# Patient Record
Sex: MALE | Race: BLACK OR AFRICAN AMERICAN | Employment: OTHER | URBAN - METROPOLITAN AREA
[De-identification: names, ages, dates, MRNs, and addresses within clinical notes are randomized per-mention and may not be internally consistent; named-entity substitution may affect disease eponyms.]

---

## 2019-11-12 ENCOUNTER — OP HISTORICAL/CONVERTED ENCOUNTER (OUTPATIENT)
Dept: OTHER | Age: 59
End: 2019-11-12

## 2022-06-07 ENCOUNTER — APPOINTMENT (OUTPATIENT)
Dept: CT IMAGING | Age: 62
End: 2022-06-07
Attending: PHYSICIAN ASSISTANT
Payer: MEDICAID

## 2022-06-07 ENCOUNTER — HOSPITAL ENCOUNTER (EMERGENCY)
Age: 62
Discharge: HOME | End: 2022-06-07
Attending: EMERGENCY MEDICINE
Payer: MEDICAID

## 2022-06-07 VITALS
HEIGHT: 74 IN | HEART RATE: 94 BPM | WEIGHT: 175 LBS | BODY MASS INDEX: 22.46 KG/M2 | SYSTOLIC BLOOD PRESSURE: 153 MMHG | OXYGEN SATURATION: 97 % | DIASTOLIC BLOOD PRESSURE: 100 MMHG | TEMPERATURE: 98.1 F | RESPIRATION RATE: 16 BRPM

## 2022-06-07 DIAGNOSIS — M54.2 CERVICALGIA: ICD-10-CM

## 2022-06-07 DIAGNOSIS — M50.30 DDD (DEGENERATIVE DISC DISEASE), CERVICAL: ICD-10-CM

## 2022-06-07 DIAGNOSIS — S09.90XA CLOSED HEAD INJURY, INITIAL ENCOUNTER: ICD-10-CM
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
## 2022-06-07 PROCEDURE — 74011250637 HC RX REV CODE- 250/637: Performed by: PHYSICIAN ASSISTANT

## 2022-06-07 PROCEDURE — 99284 EMERGENCY DEPT VISIT MOD MDM: CPT

## 2022-06-07 PROCEDURE — 72125 CT NECK SPINE W/O DYE: CPT

## 2022-06-07 PROCEDURE — 70450 CT HEAD/BRAIN W/O DYE: CPT

## 2022-06-07 RX ORDER — METHYLPREDNISOLONE 4 MG/1
TABLET ORAL
Qty: 1 DOSE PACK | Refills: 0 | Status: SHIPPED | OUTPATIENT
Start: 2022-06-07

## 2022-06-07 RX ORDER — TRAMADOL HYDROCHLORIDE 50 MG/1
50 TABLET ORAL
Qty: 12 TABLET | Refills: 0 | Status: SHIPPED | OUTPATIENT
Start: 2022-06-07 | End: 2022-06-10

## 2022-06-07 RX ORDER — KETOROLAC TROMETHAMINE 10 MG/1
10 TABLET, FILM COATED ORAL
Qty: 12 TABLET | Refills: 0 | Status: SHIPPED | OUTPATIENT
Start: 2022-06-07 | End: 2022-06-10

## 2022-06-07 RX ORDER — TRAMADOL HYDROCHLORIDE 50 MG/1
50 TABLET ORAL
Status: COMPLETED | OUTPATIENT
Start: 2022-06-07 | End: 2022-06-07

## 2022-06-07 RX ADMIN — TRAMADOL HYDROCHLORIDE 50 MG: 50 TABLET, COATED ORAL at 16:02

## 2022-06-07 NOTE — Clinical Note
[address] [facility] EMERGENCY DEPT  [address] [address]  [phone]    Work/School Note    Date: 6/7/2022    To Whom It May concern:    Christiano Flood was seen and treated today in the emergency room by the following provider(s):  Attending Provider: Anne Fang MD  Physician Assistant: Joelle Ritchie PA-C. Christiano Flood is excused from work/school on 06/07/22 and 06/08/22. He is medically clear to return to work/school on 6/9/2022.        Sincerely,          Romana Cole Pidcoe, PA-C
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
## 2022-08-30 ENCOUNTER — HOSPITAL ENCOUNTER (OUTPATIENT)
Dept: PHYSICAL THERAPY | Age: 62
Discharge: HOME | End: 2022-08-30
Payer: MEDICAID

## 2022-08-30 PROCEDURE — 97162 PT EVAL MOD COMPLEX 30 MIN: CPT
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
## 2022-09-14 ENCOUNTER — HOSPITAL ENCOUNTER (OUTPATIENT)
Dept: PHYSICAL THERAPY | Age: 62
Discharge: HOME | End: 2022-09-14
Payer: MEDICAID

## 2022-09-14 PROCEDURE — 97014 ELECTRIC STIMULATION THERAPY: CPT

## 2022-09-14 PROCEDURE — 97110 THERAPEUTIC EXERCISES: CPT
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
## 2022-09-19 ENCOUNTER — HOSPITAL ENCOUNTER (OUTPATIENT)
Dept: PHYSICAL THERAPY | Age: 62
Discharge: HOME | End: 2022-09-19
Payer: MEDICAID

## 2022-09-19 PROCEDURE — 97014 ELECTRIC STIMULATION THERAPY: CPT

## 2022-09-19 PROCEDURE — 97110 THERAPEUTIC EXERCISES: CPT

## 2022-09-19 NOTE — PROGRESS NOTES
PT DAILY TREATMENT NOTE     Patient Name: Андрей Mora  Date:2022  : 1960  [x]  Patient  Verified  Payor: [facility] MEDICAID / Plan: [facility] [employer] / Product Type: Managed Care Medicaid /    Treatment Area: Neck pain [M54.2]   Next MD APPT:   In time: 3:05 pm    Out time:4:00pm pm  Total Treatment Time (min): 55  Visit #: 3/12-16    SUBJECTIVE    Any medication changes, allergies to medications, adverse drug reactions, diagnosis change, or new procedure performed?:   [x] No    [] Yes (see summary sheet for update)    Pain Level (0-10 scale) pre treatment: 2/10 soreness  Pain Level (0-10 scale) post treatment: 2/10    Subjective functional status/changes:   [x] No changes reported  Pt reports no new complaints from previous session; states he feels some soreness from previous session but not pain.      OBJECTIVE    Modality rationale: decrease inflammation and decrease pain to improve the patients ability to perform ADL's and drive without pain    Min Type Additional Details   15 [x] Estim: [x]UnAtt   []Att       []TENS instruct                  [x]IFC  []Premod   []NMES                     []Other:  []w/US   []w/ice   [x]w/heat  Position:supine  Location:cervical    []  Ice     []  Heat  []  Ice massage Position:  Location:    []  Traction: [] Cervical       []Lumbar                       [] Prone          []Supine                       []Intermittent   []Continuous Lbs:  []w/heat  []W/heat and Estim    []  Ultrasound: []Continuous   [] Pulsed at:                           []1MHz   []3MHz Location:  W/cm2:    *keep ES intensity low  [x] Skin assessment post-treatment:   [x]intact  []redness- no adverse reaction   []redness - adverse reaction:     40 min Therapeutic Exercise:  [x] See flow sheet :   Rationale: increase ROM and increase strength to improve the patients ability to perform ADL's and drive without pain      With   [x] TE   [] TA   [] Neuro   [] SC   [] other: Patient Education: [x] Review HEP    [] Progressed/Changed HEP based on:   [] positioning   [] body mechanics   [] transfers   [] Use of heat/ice    [] other:          Other Objective/Functional Measures:     ASSESSMENT/Changes in Function:   Pt tolerated session well with no exacerbation of symptoms. Pt very guarded in upper traps and c/s during therex, requiring verbal cues for scapular depression and proper breathing throughout exercises. All exercises tolerated well without increase in symptoms. Plan to continue to progress per POC as tolerated. Patient will continue to benefit from skilled PT services to address functional mobility deficits, address ROM deficits, address strength deficits, analyze and address soft tissue restrictions, analyze and modify body mechanics/ergonomics, assess and modify postural abnormalities, and instruct in home and community integration to attain remaining goals. GOALS/Progress towards goals:  Patient Goal (s):  pain relief     [] Met [] Not met [] Partially met   Short Term Goals: To be accomplished in 4-6  treatments. 1) Pt will be Independent with HEP. [x] Met [] Not met [] Partially met   2) Pt will use heat/ice/muscle rubs/massage as instructed to ease pain. [] Met [] Not met [] Partially met   3) Pt will demonstrate improved postural awareness for sitting and standing to decrease pain. [] Met [] Not met [] Partially met  4) Pt will use proper sleeping techniques for pain prevention. [] Met [] Not met [] Partially met      Long Term Goals: To be accomplished in 12-16  treatments. 1) Pt will have improved AMPAC score by 5%. [] Met [] Not met [] Partially met   2) Pt will have cervical/shoulder ROM/MMT WFL to improve joint function and core stability without pain. [] Met [] Not met [] Partially met   3) Pt will be able to turn his head while crossing the street without increase of neck pain.       [] Met [] Not met [] Partially met   4) Pt will be able to look over shoulders while driving without increase of neck pain. [] Met [] Not met [] Partially met   5) patient will be able to hold his head up >60 min which watching TV and report no neck pain or discomfort. [] Met [] Not met [] Partially met      6) Pt will report fewer and less intense headaches. [] Met [] Not met [] Partially met      Treatment Plan may include any combination of the following: Therapeutic exercise, Therapeutic activities, Neuromuscular re-education, Physical agent/modality, Gait/balance training, Manual therapy, Patient education, and Functional mobility training    Frequency / Duration: Patient to be seen 2 times per week for 12-16  treatments.     Certification Period: 8/30/22-11/30/22    PLAN  [x]  Continue plan of care  [x]  Upgrade activities as tolerated       [x]  Update interventions per flow sheet       []  Discharge due to:  []  Other:     Kwabena Saliva, PTA 9/19/2022

## 2022-09-21 ENCOUNTER — HOSPITAL ENCOUNTER (OUTPATIENT)
Dept: PHYSICAL THERAPY | Age: 62
Discharge: HOME | End: 2022-09-21
Payer: MEDICAID

## 2022-09-21 PROCEDURE — 97014 ELECTRIC STIMULATION THERAPY: CPT

## 2022-09-21 PROCEDURE — 97110 THERAPEUTIC EXERCISES: CPT

## 2022-09-21 PROCEDURE — 97140 MANUAL THERAPY 1/> REGIONS: CPT

## 2022-09-21 NOTE — PROGRESS NOTES
PT DAILY TREATMENT NOTE     Patient Name: Chichi Willson  Date:2022  : 1960  [x]  Patient  Verified  Payor: [facility] MEDICAID / Plan: [employer] / Product Type: Managed Care Medicaid /    Treatment Area: Neck pain [M54.2]   Next MD APPT:   In time: 12:58 pm    Out time: 1:45pm  Total Treatment Time (min): 52  Visit #: -    SUBJECTIVE    Any medication changes, allergies to medications, adverse drug reactions, diagnosis change, or new procedure performed?:   [x] No    [] Yes (see summary sheet for update)    Pain Level (0-10 scale) pre treatment: 2/10 soreness  Pain Level (0-10 scale) post treatment: 1/10    Subjective functional status/changes:   [x] No changes reported  Pt reports no new complaints; continued soreness and stiffness in the neck.      OBJECTIVE    Modality rationale: decrease inflammation and decrease pain to improve the patients ability to perform ADL's and drive without pain    Min Type Additional Details   10 [x] Estim: [x]UnAtt   []Att       []TENS instruct                  [x]IFC  []Premod   []NMES                     []Other:  []w/US   []w/ice   [x]w/heat  Position:supine  Location:cervical    []  Ice     []  Heat  []  Ice massage Position:  Location:    []  Traction: [] Cervical       []Lumbar                       [] Prone          []Supine                       []Intermittent   []Continuous Lbs:  []w/heat  []W/heat and Estim    []  Ultrasound: []Continuous   [] Pulsed at:                           []1MHz   []3MHz Location:  W/cm2:    *keep ES intensity low  [x] Skin assessment post-treatment:   [x]intact  []redness- no adverse reaction   []redness - adverse reaction:     27 min Therapeutic Exercise:  [x] See flow sheet :   Rationale: increase ROM and increase strength to improve the patients ability to perform ADL's and drive without pain  10 min Manual Therapy: stm c/s paraspinals, gentle traction, gentle first rib mob on (R)    Rationale: decrease pain, increase ROM, increase tissue extensibility, and decrease trigger points to improve the patients ability to perform functional tasks with decreased pain     With   [x] TE   [] TA   [] Neuro   [] SC   [] other: Patient Education: [x] Review HEP    [] Progressed/Changed HEP based on:   [] positioning   [] body mechanics   [] transfers   [] Use of heat/ice    [] other:          Other Objective/Functional Measures:     *Note pt has significant (R)first rib elevation       ASSESSMENT/Changes in Function:   Pt tolerated session well with no exacerbation of symptoms. Pt continues to present very guarded throughout therex in c/s musculature, requiring cues for breathing and relaxation. Note pt with significant (R) first rib elevation w/ TTP. Manual therapy yielded relief. Plan to continue to progress per POC as tolerated. Patient will continue to benefit from skilled PT services to address functional mobility deficits, address ROM deficits, address strength deficits, analyze and address soft tissue restrictions, analyze and modify body mechanics/ergonomics, assess and modify postural abnormalities, and instruct in home and community integration to attain remaining goals. GOALS/Progress towards goals:  Patient Goal (s):  pain relief     [] Met [] Not met [] Partially met   Short Term Goals: To be accomplished in 4-6  treatments. 1) Pt will be Independent with HEP. [x] Met [] Not met [] Partially met   2) Pt will use heat/ice/muscle rubs/massage as instructed to ease pain. [] Met [] Not met [] Partially met   3) Pt will demonstrate improved postural awareness for sitting and standing to decrease pain. [] Met [] Not met [] Partially met  4) Pt will use proper sleeping techniques for pain prevention. [] Met [] Not met [] Partially met      Long Term Goals: To be accomplished in 12-16  treatments. 1) Pt will have improved AMPAC score by 5%.         [] Met [] Not met [] Partially met   2) Pt will have cervical/shoulder ROM/MMT WFL to improve joint function and core stability without pain. [] Met [] Not met [] Partially met   3) Pt will be able to turn his head while crossing the street without increase of neck pain. [] Met [] Not met [] Partially met   4) Pt will be able to look over shoulders while driving without increase of neck pain. [] Met [] Not met [] Partially met   5) patient will be able to hold his head up >60 min which watching TV and report no neck pain or discomfort. [] Met [] Not met [] Partially met      6) Pt will report fewer and less intense headaches. [] Met [] Not met [] Partially met      Treatment Plan may include any combination of the following: Therapeutic exercise, Therapeutic activities, Neuromuscular re-education, Physical agent/modality, Gait/balance training, Manual therapy, Patient education, and Functional mobility training    Frequency / Duration: Patient to be seen 2 times per week for 12-16  treatments.     Certification Period: 8/30/22-11/30/22    PLAN  [x]  Continue plan of care  [x]  Upgrade activities as tolerated       [x]  Update interventions per flow sheet       []  Discharge due to:  [x]  Other: add R first rib mob w/ strap    Junior Alcantar, PTA 9/21/2022

## 2022-09-26 ENCOUNTER — HOSPITAL ENCOUNTER (OUTPATIENT)
Dept: PHYSICAL THERAPY | Age: 62
Discharge: HOME | End: 2022-09-26
Payer: MEDICAID

## 2022-09-26 PROCEDURE — 97110 THERAPEUTIC EXERCISES: CPT

## 2022-09-26 PROCEDURE — 97140 MANUAL THERAPY 1/> REGIONS: CPT

## 2022-09-26 PROCEDURE — 97014 ELECTRIC STIMULATION THERAPY: CPT

## 2022-09-26 NOTE — PROGRESS NOTES
PT DAILY TREATMENT NOTE     Patient Name: Yeimy Mensah  Date:2022  : 1960  [x]  Patient  Verified  Payor: [facility] MEDICAID / Plan: [place] [place] / Product Type: Managed Care Medicaid /    Treatment Area: Neck pain [M54.2]   Next MD APPT:   In time: 01:00 pm    Out time: 02:00 pm  Total Treatment Time (min): 60 min  Visit #: -    SUBJECTIVE    Any medication changes, allergies to medications, adverse drug reactions, diagnosis change, or new procedure performed?:   [x] No    [] Yes (see summary sheet for update)    Pain Level (0-10 scale) pre treatment: 2/10 R/L cervical  Pain Level (0-10 scale) post treatment: 0/10    Subjective functional status/changes:   [x] No changes reported  Pt reports he woke up last night with pain but was able to go back to sleep. He states he likes to sleep on his R side. \"That feels a whole lot loose now . \"  OBJECTIVE    Modality rationale: decrease inflammation and decrease pain to improve the patients ability to perform ADL's and drive without pain    Min Type Additional Details   15 [x] Estim: [x]UnAtt   []Att       []TENS instruct                  [x]IFC  []Premod   []NMES                     []Other:  []w/US   []w/ice   [x]w/heat  Position:supine  Location:cervical    []  Ice     []  Heat  []  Ice massage Position:  Location:    []  Traction: [] Cervical       []Lumbar                       [] Prone          []Supine                       []Intermittent   []Continuous Lbs:  []w/heat  []W/heat and Estim    []  Ultrasound: []Continuous   [] Pulsed at:                           []1MHz   []3MHz Location:  W/cm2:    *keep ES intensity low  [x] Skin assessment post-treatment:   [x]intact  []redness- no adverse reaction   []redness - adverse reaction:     30 min Therapeutic Exercise:  [x] See flow sheet :   Rationale: increase ROM and increase strength to improve the patients ability to perform ADL's and drive without pain  15 min Manual Therapy: Rock tool to cervical paraspinals and scalenes   Rationale: decrease pain, increase ROM, increase tissue extensibility, and decrease trigger points to improve the patients ability to perform functional tasks with decreased pain     With   [x] TE   [] TA   [] Neuro   [] SC   [] other: Patient Education: [x] Review HEP    [] Progressed/Changed HEP based on:   [] positioning   [] body mechanics   [] transfers   [] Use of heat/ice    [x] other: Rock tool         Other Objective/Functional Measures:added scapular exercises in prone and supine  Patient with several trigger points to R UT/scalenes and L>R paraspinals. Decrease in pain/soreness noted following MT          ASSESSMENT/Changes in Function: Patient tolerance to treatment:  [] poor  [] fair  [x] good  []  excellent Patient with several trigger points noted. He was very TTP on L/R. He tolerated scapular exercises well and reported decrease in soreness. Patient also has skin discoloration along UT/cervical spine area due to medication he is taking. No pain post treatment session today. Patient will continue to benefit from skilled PT services to address functional mobility deficits, address ROM deficits, address strength deficits, analyze and address soft tissue restrictions, analyze and modify body mechanics/ergonomics, assess and modify postural abnormalities, and instruct in home and community integration to attain remaining goals. GOALS/Progress towards goals:  Patient Goal (s):  pain relief     [] Met [] Not met [] Partially met   Short Term Goals: To be accomplished in 4-6  treatments. 1) Pt will be Independent with HEP. [x] Met [] Not met [] Partially met   2) Pt will use heat/ice/muscle rubs/massage as instructed to ease pain. [] Met [] Not met [] Partially met   3) Pt will demonstrate improved postural awareness for sitting and standing to decrease pain.       [] Met [] Not met [] Partially met  4) Pt will use proper sleeping techniques for pain prevention. [] Met [] Not met [] Partially met      Long Term Goals: To be accomplished in 12-16  treatments. 1) Pt will have improved AMPAC score by 5%. [] Met [] Not met [] Partially met   2) Pt will have cervical/shoulder ROM/MMT WFL to improve joint function and core stability without pain. [] Met [] Not met [] Partially met   3) Pt will be able to turn his head while crossing the street without increase of neck pain. [] Met [] Not met [] Partially met   4) Pt will be able to look over shoulders while driving without increase of neck pain. [] Met [] Not met [] Partially met   5) patient will be able to hold his head up >60 min which watching TV and report no neck pain or discomfort. [] Met [] Not met [] Partially met      6) Pt will report fewer and less intense headaches. [] Met [] Not met [] Partially met      Treatment Plan may include any combination of the following: Therapeutic exercise, Therapeutic activities, Neuromuscular re-education, Physical agent/modality, Gait/balance training, Manual therapy, Patient education, and Functional mobility training    Frequency / Duration: Patient to be seen 2 times per week for 12-16  treatments.     Certification Period: 8/30/22-11/30/22    PLAN  [x]  Continue plan of care  [x]  Upgrade activities as tolerated       [x]  Update interventions per flow sheet       []  Discharge due to:  [x]  Other: add R first rib mob w/ strap    Didi Monaco, PTA 9/26/2022

## 2022-09-28 ENCOUNTER — HOSPITAL ENCOUNTER (OUTPATIENT)
Dept: PHYSICAL THERAPY | Age: 62
Discharge: HOME | End: 2022-09-28
Payer: MEDICAID

## 2022-09-28 PROCEDURE — 97110 THERAPEUTIC EXERCISES: CPT

## 2022-09-28 NOTE — PROGRESS NOTES
PT DAILY TREATMENT NOTE     Patient Name: Chevy Herring  Date:2022  : 1960  [x]  Patient  Verified  Payor: [facility] MEDICAID / Plan: [place] [place] / Product Type: Managed Care Medicaid /    Treatment Area: Neck pain [M54.2]   Next MD APPT: Nov. ?  In time: 01:00 pm   Out time: 01:45 pm  Total Treatment Time (min): 45 min  Visit #:     SUBJECTIVE    Any medication changes, allergies to medications, adverse drug reactions, diagnosis change, or new procedure performed?:   [x] No    [] Yes (see summary sheet for update)    Pain Level (0-10 scale) pre treatment: 0/10 R/L cervical  Pain Level (0-10 scale) post treatment: 0/10    Subjective functional status/changes:   [x] No changes reported  Patient reported he did not have pain today. He felt better after last session. He reports he still gets pain every now and then. He has also been massaging his neck . He did think the new exercises helped.      OBJECTIVE    Modality rationale: decrease inflammation and decrease pain to improve the patients ability to perform ADL's and drive without pain    Min Type Additional Details    [] Estim: []UnAtt   []Att       []TENS instruct                  []IFC  []Premod   []NMES                     []Other:  []w/US   []w/ice   []w/heat  Position:supine  Location:cervical    []  Ice     []  Heat  []  Ice massage Position:  Location:    []  Traction: [] Cervical       []Lumbar                       [] Prone          []Supine                       []Intermittent   []Continuous Lbs:  []w/heat  []W/heat and Estim    []  Ultrasound: []Continuous   [] Pulsed at:                           []1MHz   []3MHz Location:  W/cm2:    *keep ES intensity low  [] Skin assessment post-treatment:   []intact  []redness- no adverse reaction   []redness - adverse reaction:     45 min Therapeutic Exercise:  [x] See flow sheet :   Rationale: increase ROM and increase strength to improve the patients ability to perform ADL's and drive without pain   min Manual Therapy: Rock tool to cervical paraspinals and scalenes   Rationale: decrease pain, increase ROM, increase tissue extensibility, and decrease trigger points to improve the patients ability to perform functional tasks with decreased pain     With   [x] TE   [] TA   [] Neuro   [] SC   [] other: Patient Education: [x] Review HEP    [] Progressed/Changed HEP based on:   [] positioning   [] body mechanics   [] transfers   [] Use of heat/ice    [] other:         Other Objective/Functional Measures:continue with scapular exercises in prone and supine  Reviewed cervical exercises     ASSESSMENT/Changes in Function: Patient tolerance to treatment:  [] poor  [] fair  [x] good  []  excellent Patient reporting no pain pre and post treatment session. No modalities given due to no pain. He required min to no cues with exercises showing good technique and carry over. Will reassess modalities next visit. Patient will continue to benefit from skilled PT services to address functional mobility deficits, address ROM deficits, address strength deficits, analyze and address soft tissue restrictions, analyze and modify body mechanics/ergonomics, assess and modify postural abnormalities, and instruct in home and community integration to attain remaining goals. GOALS/Progress towards goals:  Patient Goal (s):  pain relief     [] Met [] Not met [] Partially met   Short Term Goals: To be accomplished in 4-6  treatments. 1) Pt will be Independent with HEP. [x] Met [] Not met [] Partially met   2) Pt will use heat/ice/muscle rubs/massage as instructed to ease pain. [x] Met [] Not met [] Partially met   3) Pt will demonstrate improved postural awareness for sitting and standing to decrease pain. [] Met [] Not met [] Partially met  4) Pt will use proper sleeping techniques for pain prevention. [] Met [] Not met [] Partially met      Long Term Goals:  To be accomplished in 12-16  treatments. 1) Pt will have improved AMPAC score by 5%. [] Met [] Not met [] Partially met   2) Pt will have cervical/shoulder ROM/MMT WFL to improve joint function and core stability without pain. [] Met [] Not met [] Partially met   3) Pt will be able to turn his head while crossing the street without increase of neck pain. [] Met [] Not met [] Partially met   4) Pt will be able to look over shoulders while driving without increase of neck pain. [] Met [] Not met [] Partially met   5) patient will be able to hold his head up >60 min which watching TV and report no neck pain or discomfort. [] Met [] Not met [] Partially met      6) Pt will report fewer and less intense headaches. [] Met [] Not met [] Partially met      Treatment Plan may include any combination of the following: Therapeutic exercise, Therapeutic activities, Neuromuscular re-education, Physical agent/modality, Gait/balance training, Manual therapy, Patient education, and Functional mobility training    Frequency / Duration: Patient to be seen 2 times per week for 12-16  treatments.     Certification Period: 8/30/22-11/30/22    PLAN  [x]  Continue plan of care  [x]  Upgrade activities as tolerated       [x]  Update interventions per flow sheet       []  Discharge due to:  [x]  Other: Reassess next visit  Didi Monaco, PTA 9/28/2022

## 2022-10-04 ENCOUNTER — HOSPITAL ENCOUNTER (OUTPATIENT)
Dept: PHYSICAL THERAPY | Age: 62
Discharge: HOME | End: 2022-10-04
Payer: MEDICAID

## 2022-10-04 PROCEDURE — 97110 THERAPEUTIC EXERCISES: CPT

## 2022-10-04 NOTE — PROGRESS NOTES
PT DAILY TREATMENT NOTE     Patient Name: Venkata Stephen  MRMK:[id number]  : 1960  [x]  Patient  Verified  Payor: [facility] MEDICAID / Plan: [place] [place] / Product Type: Managed Care Medicaid /    Treatment Area: Neck pain [M54.2]   Next MD APPT:    In time: 01:50 pm   Out time: 2:52   pm  Total Treatment Time (min): 60 min  Visit #:     SUBJECTIVE    Any medication changes, allergies to medications, adverse drug reactions, diagnosis change, or new procedure performed?:   [x] No    [] Yes (see summary sheet for update)    Pain Level (0-10 scale) pre treatment: 0/10 R/L cervical  Pain Level (0-10 scale) post treatment: 0/10    Subjective functional status/changes:   [x] No changes reported  Patient reports no pain but reports soreness, he reports about 95% improvement. He reports no pain stated he has not bee hurting. Patient reports he is not working because he is scared of hurting his neck. He still reports limitation with lifting. He has been doing his exercises at home. Reports pain/stiffness in the mornings.        OBJECTIVE    Modality rationale: decrease inflammation and decrease pain to improve the patients ability to perform ADL's and drive without pain    Min Type Additional Details   15 [x] Estim: []UnAtt   []Att       []TENS instruct                  []IFC  []Premod   []NMES                     []Other:  []w/US   []w/ice   []w/heat  Position:supine  Location:cervical    []  Ice     []  Heat  []  Ice massage Position:  Location:    []  Traction: [] Cervical       []Lumbar                       [] Prone          []Supine                       []Intermittent   []Continuous Lbs:  []w/heat  []W/heat and Estim    []  Ultrasound: []Continuous   [] Pulsed at:                           []1MHz   []3MHz Location:  W/cm2:    *keep ES intensity low  [x] Skin assessment post-treatment:   [x]intact  []redness- no adverse reaction   []redness - adverse reaction:     45 min Therapeutic Exercise:  [x] See flow sheet :   Rationale: increase ROM and increase strength to improve the patients ability to perform ADL's and drive without pain   min Manual Therapy: Rock tool to cervical paraspinals and scalenes   Rationale: decrease pain, increase ROM, increase tissue extensibility, and decrease trigger points to improve the patients ability to perform functional tasks with decreased pain     With   [x] TE   [] TA   [] Neuro   [] SC   [] other: Patient Education: [x] Review HEP    [] Progressed/Changed HEP based on:   [] positioning   [] body mechanics   [] transfers   [] Use of heat/ice    [] other:         Other Objective/Functional Measures:continue with scapular exercises in prone and supine  Reviewed cervical exercises         Re-assessment 10/4   Objective/Functional Measures including ROM/MMT:   Physical Findings   Ortho:   Posture: more relaxed posture no hiking still B winging scapulas   Palpation: TTP on R>L UT larger trigger point p!    Gross findings:  slander, R handed     Spine: *normal values in ()  CERVICAL                                                ROM                                  Strength        Flexion  70       Extension 45      Protraction WNL     Retraction Limited and painful           R L R L   Lateral Flexion (45) 25 25        Rotation (90) 60 60        Additional comments: stiffness      Specific joints:   SHOULDER                                         AROM                        PROM                       MMT    R L R L R L   Flexion (180) 170 170    5- 5-   Extension (60)            Abduction (180) 170 170    5 5    Adduction (0)               IR (70)         4+ 4   ER (90)         4+  4   Horizontal Abduction (130)               Horizontal Adduction (45)               Additional comments:         Apley coupled movement  ER - B T1 -improved   IR  T10 -improved   No pain reported                                          Strength:  R  35lbs   L  40 lbs - (L) Hp from CVA                 Special Tests: Neer Impingement: (+) R >L                                      ASSESSMENT/Changes in Function:   Patient has been in therapy for about 1 month now, he has been progressing well with the exercises and we noted improvement with his AROM in both cervical and shoulder joint. He is less guarded, but still presents with scapula and RC weakness and tightness in R>L upper trap. He has increased trigger point on the R upper trap and that the only thing that hurts him. Patient also reports no pain but reports overall stiffness and soreness specially in the mornings, he reports about 95% improvement. Patient stated he is not working because he is scared of hurting his neck. He still reports limitation with lifting. He has been doing his exercises at home. Patient has met 3/4 of his STG's and 2/6 of his LTG and he is progressing with the rest .   Patient tolerance to treatment:  [] poor  [] fair  [x] good  []  excellent. He reports some weakness /pain with supine TYI, patient requested ES and heat post supine exercises and stated he felt better after and pain subsided. Patient will continue to benefit from skilled PT services to address functional mobility deficits, address ROM deficits, address strength deficits, analyze and address soft tissue restrictions, analyze and modify body mechanics/ergonomics, assess and modify postural abnormalities, and instruct in home and community integration to attain remaining goals. GOALS/Progress towards goals:  Patient Goal (s):  pain relief     [] Met [] Not met [x] Partially met     Short Term Goals: To be accomplished in 4-6  treatments. 1) Pt will be Independent with HEP. [x] Met [] Not met [] Partially met   2) Pt will use heat/ice/muscle rubs/massage as instructed to ease pain. [x] Met [] Not met [] Partially met   3) Pt will demonstrate improved postural awareness for sitting and standing to decrease pain. [x] Met [] Not met [] Partially met 10/4   4) Pt will use proper sleeping techniques for pain prevention. [] Met [] Not met [x] Partially met 10/4      Long Term Goals: To be accomplished in 12-16  treatments. 1) Pt will have improved AMPAC score by 5%. [x] Met [] Not met [] Partially met   2) Pt will have cervical/shoulder ROM/MMT WFL to improve joint function and core stability without pain. [] Met [] Not met [x] Partially met Full shoulder AROM/ cervical partially progressing   3) Pt will be able to turn his head while crossing the street without increase of neck pain. [] Met [] Not met [x] Partially met   4) Pt will be able to look over shoulders while driving without increase of neck pain. [] Met [x] Not met [] Partially met - stated he turns his all body because of the stiffness. 5) patient will be able to hold his head up >60 min which watching TV and report no neck pain or discomfort. [x] Met [] Not met [] Partially met 10/4   6) Pt will report fewer and less intense headaches. [x] Met [] Not met [] Partially met 10/4      Treatment Plan may include any combination of the following: Therapeutic exercise, Therapeutic activities, Neuromuscular re-education, Physical agent/modality, Gait/balance training, Manual therapy, Patient education, and Functional mobility training    Frequency / Duration: Patient to be seen 2 times per week for 12-16  treatments.     Certification Period: 8/30/22-11/30/22    PLAN  [x]  Continue plan of care  [x]  Upgrade activities as tolerated       [x]  Update interventions per flow sheet       []  Discharge due to:  [x]  Other: Reassess next visit  Zelda Garcia, PT, DPT 10/4/2022

## 2022-10-04 NOTE — PROGRESS NOTES
[address], [address] [place] [address]  Ph: [phone]    Fax: [phone]  Therapy Progress Report  Name: Aminah Floyd  : 1960   MD: Humble Torres MD     Medical Diagnosis: Neck pain [M54.2]  Start of Care:22    Visits from Start of Care: 7     Missed Visits: 0  Certification Period: 22-22    Frequency/Duration: 2 times a week for 12-16  treatments     Summary of Care/Goals:  Patient has been in therapy for about 1 month now, he has been progressing well with the exercises and we noted improvement with his AROM in both cervical and shoulder joint. He is less guarded, but still presents with scapula and RC weakness and tightness in R>L upper trap. He has increased trigger point on the R upper trap and that the only thing that hurts him. Patient also reports no pain but reports overall stiffness and soreness specially in the mornings, he reports about 95% improvement. Patient stated he is not working because he is scared of hurting his neck. He still reports limitation with lifting. He has been doing his exercises at home. Patient has met 3/4 of his STG's and 2/6 of his LTG and he is progressing with the rest . Patient will continue to benefit from skilled PT services to address functional mobility deficits, address ROM deficits, address strength deficits, analyze and address soft tissue restrictions, analyze and modify body mechanics/ergonomics, assess and modify postural abnormalities, and instruct in home and community integration to attain remaining goals. GOALS/Progress towards goals:  Patient Goal (s):  pain relief     [] Met [] Not met [x] Partially met      Short Term Goals: To be accomplished in 4-6  treatments. 1) Pt will be Independent with HEP. [x] Met [] Not met [] Partially met   2) Pt will use heat/ice/muscle rubs/massage as instructed to ease pain.       [x] Met [] Not met [] Partially met 3) Pt will demonstrate improved postural awareness for sitting and standing to decrease pain. [x] Met [] Not met [] Partially met 10/4   4) Pt will use proper sleeping techniques for pain prevention. [] Met [] Not met [x] Partially met 10/4      Long Term Goals: To be accomplished in 12-16  treatments. 1) Pt will have improved AMPAC score by 5%. [x] Met [] Not met [] Partially met   2) Pt will have cervical/shoulder ROM/MMT WFL to improve joint function and core stability without pain. [] Met [] Not met [x] Partially met Full shoulder AROM/ cervical partially progressing   3) Pt will be able to turn his head while crossing the street without increase of neck pain. [] Met [] Not met [x] Partially met   4) Pt will be able to look over shoulders while driving without increase of neck pain. [] Met [x] Not met [] Partially met - stated he turns his all body because of the stiffness. 5) patient will be able to hold his head up >60 min which watching TV and report no neck pain or discomfort. [x] Met [] Not met [] Partially met 10/4   6) Pt will report fewer and less intense headaches. [x] Met [] Not met [] Partially met 10/4       Re-assessment 10/4   Objective/Functional Measures including ROM/MMT:   Physical Findings   Ortho:   Posture: more relaxed posture no hiking still B winging scapulas   Palpation: TTP on R>L UT larger trigger point p!    Gross findings:  slander, R handed      Spine: *normal values in ()  CERVICAL                                                ROM                                  Strength        Flexion  70       Extension 45      Protraction WNL     Retraction Limited and painful           R L R L   Lateral Flexion (45) 25 25        Rotation (90) 60 60        Additional comments: stiffness       Specific joints:   SHOULDER                                         AROM                        PROM                       MMT    R L R L R L   Flexion (180) 170 170     5- 5-   Extension (60)               Abduction (180) 170 170     5 5    Adduction (0)               IR (70)         4+ 4   ER (90)         4+  4   Horizontal Abduction (130)               Horizontal Adduction (45)               Additional comments:         Apley coupled movement  ER - B T1 -improved   IR  T10 -improved   No pain reported                                          Strength:  R  35lbs   L  40 lbs - (L) Hp from CVA                 Special Tests: Neer Impingement: (+) R >L                                      Ampac Score:  5.92%  Recommendations: continue with shoulder RC/scapula strengthening. [x]  Plan of care has been reviewed with PTA. Zelda Garcia, PT, DPT 10/4/2022     ________________________________________________________________________     Please retain this original for your records.

## 2022-10-06 ENCOUNTER — APPOINTMENT (OUTPATIENT)
Dept: PHYSICAL THERAPY | Age: 62
End: 2022-10-06
Payer: MEDICAID

## 2022-10-11 ENCOUNTER — HOSPITAL ENCOUNTER (OUTPATIENT)
Dept: PHYSICAL THERAPY | Age: 62
Discharge: HOME | End: 2022-10-11
Payer: MEDICAID

## 2022-10-11 PROCEDURE — 97110 THERAPEUTIC EXERCISES: CPT

## 2022-10-11 NOTE — PROGRESS NOTES
PT DAILY TREATMENT NOTE     Patient Name: Claudean Rumple  Date:10/11/2022  : 1960  [x]  Patient  Verified  Payor: [facility] MEDICAID / Plan: [facility]ass [facility] / Product Type: Managed Care Medicaid /    Treatment Area: Neck pain [M54.2]   Next MD APPT:    In time: 01:00 pm   Out time: 01:40   pm  Total Treatment Time (min): 40 min  Visit #: -    SUBJECTIVE    Any medication changes, allergies to medications, adverse drug reactions, diagnosis change, or new procedure performed?:   [x] No    [] Yes (see summary sheet for update)    Pain Level (0-10 scale) pre treatment: 1/10 R/L cervical  Pain Level (0-10 scale) post treatment: 0/10    Subjective functional status/changes:   [x] No changes reported  Pt reports he is feeling improvement; feels he is ready to return to work as he is feeling more comfortable in the c/s and less fear of re-injury.         OBJECTIVE    Modality rationale: decrease inflammation and decrease pain to improve the patients ability to perform ADL's and drive without pain    Min Type Additional Details    [] Estim: []UnAtt   []Att       []TENS instruct                  []IFC  []Premod   []NMES                     []Other:  []w/US   []w/ice   []w/heat  Position:supine  Location:cervical    []  Ice     []  Heat  []  Ice massage Position:  Location:    []  Traction: [] Cervical       []Lumbar                       [] Prone          []Supine                       []Intermittent   []Continuous Lbs:  []w/heat  []W/heat and Estim    []  Ultrasound: []Continuous   [] Pulsed at:                           []1MHz   []3MHz Location:  W/cm2:    *keep ES intensity low  [] Skin assessment post-treatment:   [x]intact  []redness- no adverse reaction   []redness - adverse reaction:     40 min Therapeutic Exercise:  [x] See flow sheet :   Rationale: increase ROM and increase strength to improve the patients ability to perform ADL's and drive without pain   min Manual Therapy: Rock tool to cervical paraspinals and scalenes   Rationale: decrease pain, increase ROM, increase tissue extensibility, and decrease trigger points to improve the patients ability to perform functional tasks with decreased pain     With   [x] TE   [] TA   [] Neuro   [] SC   [] other: Patient Education: [x] Review HEP    [] Progressed/Changed HEP based on:   [] positioning   [] body mechanics   [] transfers   [] Use of heat/ice    [] other:         Other Objective/Functional Measures: Added theraband strengthening    ASSESSMENT/Changes in Function:   Pt tolerated session well with no exacerbation of symptoms. Tolerated addition of theraband RC/scapular strengthening well, requiring min cues for form, demonstrating good carryover. Pt is overall less guarded in the c/s with exercises. Plan to continue to progress per POC as tolerated. Patient will continue to benefit from skilled PT services to address functional mobility deficits, address ROM deficits, address strength deficits, analyze and address soft tissue restrictions, analyze and modify body mechanics/ergonomics, assess and modify postural abnormalities, and instruct in home and community integration to attain remaining goals. GOALS/Progress towards goals:  Patient Goal (s):  pain relief     [] Met [] Not met [x] Partially met     Short Term Goals: To be accomplished in 4-6  treatments. 1) Pt will be Independent with HEP. [x] Met [] Not met [] Partially met   2) Pt will use heat/ice/muscle rubs/massage as instructed to ease pain. [x] Met [] Not met [] Partially met   3) Pt will demonstrate improved postural awareness for sitting and standing to decrease pain. [x] Met [] Not met [] Partially met 10/4   4) Pt will use proper sleeping techniques for pain prevention. [] Met [] Not met [x] Partially met 10/4      Long Term Goals: To be accomplished in 12-16  treatments. 1) Pt will have improved AMPAC score by 5%.         [x] Met [] Not met [] Partially met   2) Pt will have cervical/shoulder ROM/MMT WFL to improve joint function and core stability without pain. [] Met [] Not met [x] Partially met Full shoulder AROM/ cervical partially progressing   3) Pt will be able to turn his head while crossing the street without increase of neck pain. [] Met [] Not met [x] Partially met   4) Pt will be able to look over shoulders while driving without increase of neck pain. [] Met [x] Not met [] Partially met - stated he turns his all body because of the stiffness. 5) patient will be able to hold his head up >60 min which watching TV and report no neck pain or discomfort. [x] Met [] Not met [] Partially met 10/4   6) Pt will report fewer and less intense headaches. [x] Met [] Not met [] Partially met 10/4      Treatment Plan may include any combination of the following: Therapeutic exercise, Therapeutic activities, Neuromuscular re-education, Physical agent/modality, Gait/balance training, Manual therapy, Patient education, and Functional mobility training    Frequency / Duration: Patient to be seen 2 times per week for 12-16  treatments.     Certification Period: 8/30/22-11/30/22    PLAN  [x]  Continue plan of care  [x]  Upgrade activities as tolerated       [x]  Update interventions per flow sheet       []  Discharge due to:  []  Other:  Brandon Roger, LEW 10/11/2022

## 2022-10-13 ENCOUNTER — HOSPITAL ENCOUNTER (OUTPATIENT)
Dept: PHYSICAL THERAPY | Age: 62
Discharge: HOME | End: 2022-10-13
Payer: MEDICAID

## 2022-10-13 PROCEDURE — 97110 THERAPEUTIC EXERCISES: CPT

## 2022-10-13 NOTE — PROGRESS NOTES
PT DAILY TREATMENT NOTE     Patient Name: Claudean Rumple  LOO  : 1960  [x]  Patient  Verified  Payor: [facility] MEDICAID / Plan: [facility] [employer] / Product Type: Managed Care Medicaid /    Treatment Area: Neck pain [M54.2]   Next MD APPT:    In time: 01:00 pm   Out time: 01:40   pm  Total Treatment Time (min): 40 min  Visit #:     SUBJECTIVE    Any medication changes, allergies to medications, adverse drug reactions, diagnosis change, or new procedure performed?:   [x] No    [] Yes (see summary sheet for update)    Pain Level (0-10 scale) pre treatment: 0/10 C/S  Pain Level (0-10 scale) post treatment: 0/10 C/S    Subjective functional status/changes:   [x] No changes reported  Pt reports continued improvement, no pain in the c/s       OBJECTIVE    40 min Therapeutic Exercise:  [x] See flow sheet :   Rationale: increase ROM and increase strength to improve the patients ability to perform ADL's and drive without pain   min Manual Therapy: Rock tool to cervical paraspinals and scalenes   Rationale: decrease pain, increase ROM, increase tissue extensibility, and decrease trigger points to improve the patients ability to perform functional tasks with decreased pain     With   [x] TE   [] TA   [] Neuro   [] SC   [] other: Patient Education: [x] Review HEP    [] Progressed/Changed HEP based on:   [] positioning   [] body mechanics   [] transfers   [] Use of heat/ice    [] other:         Other Objective/Functional Measures: Added supine serratus punch and prone I's and W's    ASSESSMENT/Changes in Function:   Pt tolerated session well with no exacerbation of symptoms. Continues to tolerate progress scapular stabilization and RC exercises well, pt demonstrated good form with all activities.  Plan to continue to progress strengthening per POC as tolerated  Patient will continue to benefit from skilled PT services to address functional mobility deficits, address ROM deficits, address strength deficits, analyze and address soft tissue restrictions, analyze and modify body mechanics/ergonomics, assess and modify postural abnormalities, and instruct in home and community integration to attain remaining goals. GOALS/Progress towards goals:  Patient Goal (s):  pain relief     [x] Met [] Not met [] Partially met 10/13 pt has been having no pain     Short Term Goals: To be accomplished in 4-6  treatments. 1) Pt will be Independent with HEP. [x] Met [] Not met [] Partially met   2) Pt will use heat/ice/muscle rubs/massage as instructed to ease pain. [x] Met [] Not met [] Partially met   3) Pt will demonstrate improved postural awareness for sitting and standing to decrease pain. [x] Met [] Not met [] Partially met 10/4   4) Pt will use proper sleeping techniques for pain prevention. [] Met [] Not met [x] Partially met 10/4      Long Term Goals: To be accomplished in 12-16  treatments. 1) Pt will have improved AMPAC score by 5%. [x] Met [] Not met [] Partially met   2) Pt will have cervical/shoulder ROM/MMT WFL to improve joint function and core stability without pain. [] Met [] Not met [x] Partially met Full shoulder AROM/ cervical partially progressing   3) Pt will be able to turn his head while crossing the street without increase of neck pain. [] Met [] Not met [x] Partially met   4) Pt will be able to look over shoulders while driving without increase of neck pain. [] Met [x] Not met [] Partially met - stated he turns his all body because of the stiffness. 5) patient will be able to hold his head up >60 min which watching TV and report no neck pain or discomfort. [x] Met [] Not met [] Partially met 10/4   6) Pt will report fewer and less intense headaches.        [x] Met [] Not met [] Partially met 10/4      Treatment Plan may include any combination of the following: Therapeutic exercise, Therapeutic activities, Neuromuscular re-education, Physical agent/modality, Gait/balance training, Manual therapy, Patient education, and Functional mobility training    Frequency / Duration: Patient to be seen 2 times per week for 12-16  treatments.     Certification Period: 8/30/22-11/30/22    PLAN  [x]  Continue plan of care  [x]  Upgrade activities as tolerated       [x]  Update interventions per flow sheet       []  Discharge due to:  []  Other:  Amado Quintero, PTA 10/13/2022

## 2022-10-18 ENCOUNTER — HOSPITAL ENCOUNTER (OUTPATIENT)
Dept: PHYSICAL THERAPY | Age: 62
Discharge: HOME | End: 2022-10-18
Payer: MEDICAID

## 2022-10-18 PROCEDURE — 97110 THERAPEUTIC EXERCISES: CPT

## 2022-10-18 NOTE — PROGRESS NOTES
PT DAILY TREATMENT NOTE     Patient Name: Dorothea Martin  QSRM:  : 1960  [x]  Patient  Verified  Payor: [facility] MEDICAID / Plan: [facility] [employer] PLUS / Product Type: Managed Care Medicaid /    Treatment Area: Neck pain [M54.2]   Next MD APPT:    In time: 2:33 pm   Out time: 3:11  pm  Total Treatment Time (min): 38 min  Visit #: 10/12-    SUBJECTIVE    Any medication changes, allergies to medications, adverse drug reactions, diagnosis change, or new procedure performed?:   [x] No    [] Yes (see summary sheet for update)    Pain Level (0-10 scale) pre treatment: 0/10 C/S  Pain Level (0-10 scale) post treatment: 0/10 C/S    Subjective functional status/changes:   [x] No changes reported  Pt reports continued improvement, no pain in the c/s. Pt reports he has returned to work with no issues but is \"working smarter not harder\" and not doing heavy lifting      OBJECTIVE    38 min Therapeutic Exercise:  [x] See flow sheet :   Rationale: increase ROM and increase strength to improve the patients ability to perform ADL's and drive without pain   min Manual Therapy: Rock tool to cervical paraspinals and scalenes   Rationale: decrease pain, increase ROM, increase tissue extensibility, and decrease trigger points to improve the patients ability to perform functional tasks with decreased pain     With   [x] TE   [] TA   [] Neuro   [] SC   [] other: Patient Education: [x] Review HEP    [] Progressed/Changed HEP based on:   [] positioning   [] body mechanics   [] transfers   [] Use of heat/ice    [] other:         Other Objective/Functional Measures: Added low trap strengthening against wall and serratus wall push up    ASSESSMENT/Changes in Function:   Pt tolerated session well with no exacerbation of symptoms. Continues to tolerate progressed scapular stabilization and RC exercises well, min cues for proper form and postural awareness, demonstrating good carryover and awareness.  Plan to continue per POC as tolerated. Patient will continue to benefit from skilled PT services to address functional mobility deficits, address ROM deficits, address strength deficits, analyze and address soft tissue restrictions, analyze and modify body mechanics/ergonomics, assess and modify postural abnormalities, and instruct in home and community integration to attain remaining goals. GOALS/Progress towards goals:  Patient Goal (s):  pain relief     [x] Met [] Not met [] Partially met 10/13 pt has been having no pain     Short Term Goals: To be accomplished in 4-6  treatments. 1) Pt will be Independent with HEP. [x] Met [] Not met [] Partially met   2) Pt will use heat/ice/muscle rubs/massage as instructed to ease pain. [x] Met [] Not met [] Partially met   3) Pt will demonstrate improved postural awareness for sitting and standing to decrease pain. [x] Met [] Not met [] Partially met 10/4   4) Pt will use proper sleeping techniques for pain prevention. [] Met [] Not met [x] Partially met 10/4      Long Term Goals: To be accomplished in 12-16  treatments. 1) Pt will have improved AMPAC score by 5%. [x] Met [] Not met [] Partially met   2) Pt will have cervical/shoulder ROM/MMT WFL to improve joint function and core stability without pain. [] Met [] Not met [x] Partially met Full shoulder AROM/ cervical partially progressing   3) Pt will be able to turn his head while crossing the street without increase of neck pain. [] Met [] Not met [x] Partially met   4) Pt will be able to look over shoulders while driving without increase of neck pain. [] Met [x] Not met [] Partially met - stated he turns his all body because of the stiffness. 5) patient will be able to hold his head up >60 min which watching TV and report no neck pain or discomfort. [x] Met [] Not met [] Partially met 10/4   6) Pt will report fewer and less intense headaches.        [x] Met [] Not met [] Partially met 10/4      Treatment Plan may include any combination of the following: Therapeutic exercise, Therapeutic activities, Neuromuscular re-education, Physical agent/modality, Gait/balance training, Manual therapy, Patient education, and Functional mobility training    Frequency / Duration: Patient to be seen 2 times per week for 12-16  treatments.     Certification Period: 8/30/22-11/30/22    PLAN  [x]  Continue plan of care  [x]  Upgrade activities as tolerated       [x]  Update interventions per flow sheet       []  Discharge due to:  []  Other:  Lucero Perez, PTA 10/18/2022

## 2022-10-20 ENCOUNTER — APPOINTMENT (OUTPATIENT)
Dept: PHYSICAL THERAPY | Age: 62
End: 2022-10-20
Payer: MEDICAID

## 2022-11-08 ENCOUNTER — HOSPITAL ENCOUNTER (OUTPATIENT)
Dept: PHYSICAL THERAPY | Age: 62
Discharge: HOME | End: 2022-11-08
Payer: MEDICAID

## 2022-11-08 PROCEDURE — 97110 THERAPEUTIC EXERCISES: CPT

## 2022-11-08 NOTE — PROGRESS NOTES
PT DAILY TREATMENT NOTE     Patient Name: Dorothea Martin  Date:2022  : 1960  [x]  Patient  Verified  Payor: [facility] MEDICAID / Plan: [facility] [employer] PL[employer] / Product Type: Managed Care Medicaid /    Treatment Area: Neck pain [M54.2]   Next MD APPT:    In time: 2:35 pm   Out time: 3:25  pm  Total Treatment Time (min): 50 min  Visit #: -    SUBJECTIVE    Any medication changes, allergies to medications, adverse drug reactions, diagnosis change, or new procedure performed?:   [x] No    [] Yes (see summary sheet for update)    Pain Level (0-10 scale) pre treatment: 0/10 C/S  Pain Level (0-10 scale) post treatment: 0/10 C/S    Subjective functional status/changes:   [x] No changes reported      OBJECTIVE    50 min Therapeutic Exercise:  [x] See flow sheet :   Rationale: increase ROM and increase strength to improve the patients ability to perform ADL's and drive without pain   min Manual Therapy: Rock tool to cervical paraspinals and scalenes   Rationale: decrease pain, increase ROM, increase tissue extensibility, and decrease trigger points to improve the patients ability to perform functional tasks with decreased pain     With   [] TE   [] TA   [] Neuro   [] SC   [] other: Patient Education: [x] Review HEP    [] Progressed/Changed HEP based on:   [] positioning   [] body mechanics   [] transfers   [] Use of heat/ice    [] other: stretches         Other Objective/Functional Measures: Added wall slides, continued serratus wall push up    ASSESSMENT/Changes in Function:   Pt tolerated session well with no exacerbation of symptoms. Continues to tolerate progressed scapular stabilization and RC exercises well, min cues for proper form and postural awareness, demonstrating good carryover and awareness. Plan to continue per POC as tolerated.    Patient will continue to benefit from skilled PT services to address functional mobility deficits, address ROM deficits, address strength deficits, analyze and address soft tissue restrictions, analyze and modify body mechanics/ergonomics, assess and modify postural abnormalities, and instruct in home and community integration to attain remaining goals. GOALS/Progress towards goals:  Patient Goal (s):  pain relief     [x] Met [] Not met [] Partially met 10/13 pt has been having no pain     Short Term Goals: To be accomplished in 4-6  treatments. 1) Pt will be Independent with HEP. [x] Met [] Not met [] Partially met   2) Pt will use heat/ice/muscle rubs/massage as instructed to ease pain. [x] Met [] Not met [] Partially met   3) Pt will demonstrate improved postural awareness for sitting and standing to decrease pain. [x] Met [] Not met [] Partially met 10/4   4) Pt will use proper sleeping techniques for pain prevention. [] Met [] Not met [x] Partially met 10/4      Long Term Goals: To be accomplished in 12-16  treatments. 1) Pt will have improved AMPAC score by 5%. [x] Met [] Not met [] Partially met   2) Pt will have cervical/shoulder ROM/MMT WFL to improve joint function and core stability without pain. [] Met [] Not met [x] Partially met Full shoulder AROM/ cervical partially progressing   3) Pt will be able to turn his head while crossing the street without increase of neck pain. [] Met [] Not met [x] Partially met   4) Pt will be able to look over shoulders while driving without increase of neck pain. [] Met [x] Not met [] Partially met - stated he turns his all body because of the stiffness. 5) patient will be able to hold his head up >60 min which watching TV and report no neck pain or discomfort. [x] Met [] Not met [] Partially met 10/4   6) Pt will report fewer and less intense headaches.        [x] Met [] Not met [] Partially met 10/4      Treatment Plan may include any combination of the following: Therapeutic exercise, Therapeutic activities, Neuromuscular re-education, Physical agent/modality, Gait/balance training, Manual therapy, Patient education, and Functional mobility training    Frequency / Duration: Patient to be seen 2 times per week for 12-16  treatments.     Certification Period: 8/30/22-11/30/22    PLAN  [x]  Continue plan of care  [x]  Upgrade activities as tolerated       [x]  Update interventions per flow sheet       []  Discharge due to:  []  Other:  Tatiana Gomez, PTA 11/8/2022

## 2022-11-09 ENCOUNTER — HOSPITAL ENCOUNTER (OUTPATIENT)
Dept: PHYSICAL THERAPY | Age: 62
Discharge: HOME | End: 2022-11-09
Payer: MEDICAID

## 2022-11-09 PROCEDURE — 97110 THERAPEUTIC EXERCISES: CPT

## 2022-11-09 NOTE — PROGRESS NOTES
PT DAILY TREATMENT NOTE     Patient Name: Maurilio Sullivan  Date:2022  : 1960  [x]  Patient  Verified  Payor: [facility] MEDICAID / Plan: [facility] / Product Type: Managed Care Medicaid /    Treatment Area: Neck pain [M54.2]   Next MD APPT:    In time: 2:31 pm   Out time: 03:11 pm  Total Treatment Time (min): 38 min  Visit #: -    SUBJECTIVE    Any medication changes, allergies to medications, adverse drug reactions, diagnosis change, or new procedure performed?:   [x] No    [] Yes (see summary sheet for update)    Pain Level (0-10 scale) pre treatment: 0/10 C/S  Pain Level (0-10 scale) post treatment: 0/10 C/S    Subjective functional status/changes:   [x] No changes reported  Patient has been to therapy a total of 12 visits. He reports 90-95% improvement and has returned to PLOF. He is taking his time to increase his activity level. He reports he has not had any pain in 2 weeks. He also has not had any HA in 2 weeks. He reports he recently started back to doing push ups. \"I can't do a 100 like I used to in my younger days. \"    OBJECTIVE    38 min Therapeutic Exercise:  [x] See flow sheet :   Rationale: increase ROM and increase strength to improve the patients ability to perform ADL's and drive without pain   min Manual Therapy: Rock tool to cervical paraspinals and scalenes   Rationale: decrease pain, increase ROM, increase tissue extensibility, and decrease trigger points to improve the patients ability to perform functional tasks with decreased pain     With   [] TE   [] TA   [] Neuro   [] SC   [x] other: Patient Education: [x] Review HEP    [] Progressed/Changed HEP based on:   [] positioning   [] body mechanics   [] transfers   [] Use of heat/ice    [x] other: Reassessed and reviewed HEP         Other Objective/Functional Measures:  Physical Findings     Spine: *normal values in ()  CERVICAL                                                ROM Strength        Flexion  WNL     Extension 60     Protraction WNL     Retraction WNL          R L R L   Lateral Flexion (45)  45 40       Rotation (90) 55 55       Additional comments: no pain at end ROM    Specific joints:   SHOULDER                                         AROM                        PROM                       MMT    R L R L R L   Flexion (180) 170 160     5 5   Extension (60)               Abduction (180) 170 160    5 5   Adduction (0)               IR (70)    WNL     5 5   ER (90)    WNL     5 5   Additional comments: no pain at end ROM , CVA  L     Strength:  R    80 lbs    ( initial 35 lbs )  L     75 lbs   ( initial 40 lbs - (L) Hp from CVA )                         [facility] Score:    5.92%   ( initial 22.62% )    ASSESSMENT/Changes in Function:   Patient has been to therapy a total of 12 visits. He reports 90-95% improvement and has returned to PLOF. He is taking his time to increase his activity level. He reports he has not had any pain in 2 weeks. He also has not had any HA in 2 weeks. He has met all STG's and LTG's set. He has made improvements  with ROM/MMT and also made improvements with  strength on R/L UE. He is sleeping through the night without pain or difficulty. He can turn his head to drive and cross the street without pain or difficulty. Patient reports compliance with HEP and is being discharged to continue on his own. Has a follow up visit with MD Nov.17, 2022. GOALS/Progress towards goals:  Patient Goal (s):  pain relief     [x] Met [] Not met [] Partially met 10/13 pt has been having no pain     Short Term Goals: To be accomplished in 4-6  treatments. 1) Pt will be Independent with HEP. [x] Met [] Not met [] Partially met   2) Pt will use heat/ice/muscle rubs/massage as instructed to ease pain. [x] Met [] Not met [] Partially met   3) Pt will demonstrate improved postural awareness for sitting and standing to decrease pain.       [x] Met [] Not met [] Partially met 10/4   4) Pt will use proper sleeping techniques for pain prevention. [x] Met [] Not met [] Partially met      Long Term Goals: To be accomplished in 12-16  treatments. 1) Pt will have improved AMPAC score by 5%. [x] Met [] Not met [] Partially met   2) Pt will have cervical/shoulder ROM/MMT WFL to improve joint function and core stability without pain. [x] Met [] Not met [] Partially met   3) Pt will be able to turn his head while crossing the street without increase of neck pain. [x] Met [] Not met [] Partially met   4) Pt will be able to look over shoulders while driving without increase of neck pain. [x] Met [] Not met [] Partially met   5) patient will be able to hold his head up >60 min which watching TV and report no neck pain or discomfort. [x] Met [] Not met [] Partially met 10/4   6) Pt will report fewer and less intense headaches. [x] Met [] Not met [] Partially met 10/4      Treatment Plan may include any combination of the following: Therapeutic exercise, Therapeutic activities, Neuromuscular re-education, Physical agent/modality, Gait/balance training, Manual therapy, Patient education, and Functional mobility training    Frequency / Duration: Patient to be seen 2 times per week for 12-16  treatments.     Certification Period: 8/30/22-11/30/22    PLAN  []  Continue plan of care  []  Upgrade activities as tolerated       []  Update interventions per flow sheet       [x]  Discharge due to:per pt and meeting goals  []  Other:  Shanika Lin PTA 11/9/2022

## 2022-11-09 NOTE — PROGRESS NOTES
[address] [address] [facility] [address] [facility] [place], [facility]  Ph: [phone]  Fax: [phone]    Medicaid Discharge Summary 2-15    Patient name: Venkata Stephen  : 1960  Provider#: [id number]  Referral source: Hood Loyd MD      Medical/Treatment Diagnosis: Neck pain [M54.2]     Prior Hospitalization: see medical history     Comorbidities: See Plan of Care  Prior Level of Function: See Plan of Care  Medications: Verified on Patient Summary List  Start of Care: 22   Onset Date:22  Visits from Start of Care: 12  Missed Visits:   Certification Period : 22 to 22    Assessment/Summary of care/GOALS:   Patient has been to therapy a total of 12 visits. He reports 90-95% improvement and has returned to PLOF. He is taking his time to increase his activity level. He reports he has not had any pain in 2 weeks. He also has not had any HA in 2 weeks. He has met all STG's and LTG's set. He has made improvements  with ROM/MMT and also made improvements with  strength on R/L UE. He is sleeping through the night without pain or difficulty. He can turn his head to drive and cross the street without pain or difficulty. Patient reports compliance with HEP and is being discharged to continue on his own. Has a follow up visit with MD 2022. GOALS/Progress towards goals:  Patient Goal (s):  pain relief     [x] Met [] Not met [] Partially met 10/13 pt has been having no pain      Short Term Goals: To be accomplished in 4-6  treatments. 1) Pt will be Independent with HEP. [x] Met [] Not met [] Partially met   2) Pt will use heat/ice/muscle rubs/massage as instructed to ease pain. [x] Met [] Not met [] Partially met   3) Pt will demonstrate improved postural awareness for sitting and standing to decrease pain. [x] Met [] Not met [] Partially met 10/4   4) Pt will use proper sleeping techniques for pain prevention.        [x] Met [] Not met [] Partially met      Long Term Goals: To be accomplished in 12-16  treatments. 1) Pt will have improved AMPAC score by 5%. [x] Met [] Not met [] Partially met   2) Pt will have cervical/shoulder ROM/MMT WFL to improve joint function and core stability without pain. [x] Met [] Not met [] Partially met   3) Pt will be able to turn his head while crossing the street without increase of neck pain. [x] Met [] Not met [] Partially met   4) Pt will be able to look over shoulders while driving without increase of neck pain. [x] Met [] Not met [] Partially met   5) patient will be able to hold his head up >60 min which watching TV and report no neck pain or discomfort. [x] Met [] Not met [] Partially met 10/4   6) Pt will report fewer and less intense headaches. [x] Met [] Not met [] Partially met 10/4       Other Objective/Functional Measures:  Physical Findings      Spine: *normal values in ()  CERVICAL                                                ROM                                  Strength        Flexion  WNL     Extension 60     Protraction WNL     Retraction WNL          R L R L   Lateral Flexion (45)  45 40       Rotation (90) 55 55       Additional comments: no pain at end ROM     Specific joints:   SHOULDER                                         AROM                        PROM                       MMT    R L R L R L   Flexion (180) 170 160        5 5   Extension (60)               Abduction (180) 170 160     5 5   Adduction (0)               IR (70)    WNL     5 5   ER (90)    WNL     5 5   Additional comments: no pain at end ROM , CVA  L      Strength:  R    80 lbs    ( initial 35 lbs )  L     75 lbs   ( initial 40 lbs - (L) Hp from CVA )                         Ampac Score:    5.92%   ( initial 22.62% )    A physical therapist assistant contributed to this documentation.        RECOMMENDATIONS:  [x]Discontinue therapy:   [x]Patient has reached or is progressing toward set goals and is independent with HEP   []Patient is non-compliant or has abdicated   []Due to lack of appreciable progress towards set goals   []Patient was hospitalized or suffered illness that impacted ability to continue therapy   []Other:  Jordin Benson, PT, DPT 11/9/2022   Retain this original for your records. If you are unable to process this request in 24 hours, please contact our office.   ________________________________________________________________________  NOTE TO PHYSICIAN:  Please complete the following and FAX to: oLuisa Andrade:  Fax: [phone]   ____ I have read the above report and request that my patient be discharged from therapy.     Physician's Signature:_____________________________________________________ Date:_____________Time:_____________     Salima Becerra MD